# Patient Record
Sex: FEMALE | Race: WHITE
[De-identification: names, ages, dates, MRNs, and addresses within clinical notes are randomized per-mention and may not be internally consistent; named-entity substitution may affect disease eponyms.]

---

## 2017-11-27 ENCOUNTER — HOSPITAL ENCOUNTER (OUTPATIENT)
Dept: HOSPITAL 47 - RADMAMWWP | Age: 57
Discharge: HOME | End: 2017-11-27
Payer: COMMERCIAL

## 2017-11-27 DIAGNOSIS — Z12.31: Primary | ICD-10-CM

## 2017-11-27 PROCEDURE — 77063 BREAST TOMOSYNTHESIS BI: CPT

## 2017-11-27 NOTE — MM
Reason for exam: screening  (asymptomatic).

Last mammogram was performed 3 years and 6 months ago.



History:

Patient had first child at age 36.

Family history of breast cancer in mother at age 75.

Cyst aspiration of the left breast, 2013.



Physical Findings:

A clinical breast exam by your physician is recommended on an annual basis and 

results should be correlated with mammographic findings.



MG 3D Screening Mammo W/Cad

Bilateral CC and MLO view(s) were taken.

Prior study comparison: June 11, 2014, bilateral MG diagnostic mammo w CAD [facility]AMILETH.  

May 8, 2013, CAD bilateral diagnostic mammogram.

The breast tissue is heterogeneously dense. This may lower the sensitivity of 

mammography.

Finding: There are faint, fine grouped calcifications in the upper quadrant, 

anterior position of the left breast in the MLO position 2 cm from nipple.

New finding since June 11, 2014 and May 8, 2013.





ASSESSMENT: Incomplete: need additional imaging evaluation, BI-RAD 0



RECOMMENDATION:

Special view mammogram of the left breast.



If lesion persists on supplemental views, image directed ultrasound is 

recommended.



[facility] will attempt to contact patient to return for supplemental 

views and ultrasound if indicated.

## 2017-11-30 ENCOUNTER — HOSPITAL ENCOUNTER (OUTPATIENT)
Dept: HOSPITAL 47 - RADMAMWWP | Age: 57
Discharge: HOME | End: 2017-11-30
Payer: COMMERCIAL

## 2017-11-30 DIAGNOSIS — R92.8: Primary | ICD-10-CM

## 2017-11-30 NOTE — MM
Reason for exam: additional evaluation requested from abnormal screening.

Last mammogram was performed less than 1 month ago.



History:

Patient is postmenopausal and had first child at age 36.

Family history of breast cancer in mother at age 75.

Cyst aspiration of the left breast, 2013.



Physical Findings:

Nurse did not find any significant physical abnormalities on exam.



MG Work Up Mamm w CAD BILAT

Bilateral LM, CC with magnification, and LM with magnification view(s) were taken.

Prior study comparison: November 27, 2017, bilateral MG 3d screening mammo w/cad. 

June 11, 2014, bilateral MG diagnostic mammo w CAD YAMILETH.

The breast tissue is heterogeneously dense. This may lower the sensitivity of 

mammography.  Left small heterogeneous group of calcifications far laterally at 

3-4 o'clock anteriorly, not clearly seen previously for which a biopsy 

recommended.

Right 9 o'clock small group of increasing calcifications at 9 o'clock middle depth

appear punctate, round and number approximately 4 calcifications for which a 6 

month follow up is recommended.



These results were verbally communicated with the patient and result sheet given 

to the patient on 11/30/17.





ASSESSMENT: Suspicious, BI-RAD 4

Suspicious, BI-RAD 4 abnormality in the left breast.

Probably benign, BI-RAD 3 finding in the right breast.



RECOMMENDATION:

Surgical consultation and stereotactic core biopsy of the left breast.

Follow-up diagnostic mammogram of the right breast in 6 months.



Called Dr. Lopez with mammographic findings and has scheduled an appointment for 

the patient with Dr. Langford.





PRELIMINARY REPORT CALLED AND FAXED TO DR. LANGFORD ON 11/30/17.

## 2018-06-14 ENCOUNTER — HOSPITAL ENCOUNTER (OUTPATIENT)
Dept: HOSPITAL 47 - RADMAMWWP | Age: 58
Discharge: HOME | End: 2018-06-14
Payer: COMMERCIAL

## 2018-06-14 DIAGNOSIS — R92.8: Primary | ICD-10-CM

## 2018-06-14 PROCEDURE — 77062 BREAST TOMOSYNTHESIS BI: CPT

## 2018-06-14 PROCEDURE — 77066 DX MAMMO INCL CAD BI: CPT

## 2018-06-14 NOTE — MM
Reason for exam: follow-up at short interval from prior study.

Last mammogram was performed 6 months ago.



History:

Patient is postmenopausal and had first child at age 36.

Family history of breast cancer in mother at age 75.

MG discontinued stereo core LT of the left breast, December 8, 2017.  Cyst 

aspiration of the left breast, 2013.



Physical Findings:

Nurse did not find any significant physical abnormalities on exam.



MG 3D Diag Mammo W/Cad YAMILETH

Bilateral CC, MLO, CC with magnification, and LM view(s) were taken.

Prior study comparison: November 30, 2017, bilateral MG work up mamm w CAD BILAT. 

November 27, 2017, bilateral MG 3d screening mammo w/cad.

The breast tissue is heterogeneously dense. This may lower the sensitivity of 

mammography.

Finding: There are few typically benign round, grouped, regional calcifications in

both breasts. There is no discrete abnormality.



These results were verbally communicated with the patient and result sheet given 

to the patient on 6/14/18.





ASSESSMENT: Benign, BI-RAD 2



RECOMMENDATION:

Routine screening mammogram of both breasts in 1 year.

## 2019-06-17 ENCOUNTER — HOSPITAL ENCOUNTER (OUTPATIENT)
Dept: HOSPITAL 47 - RADMAMWWP | Age: 59
Discharge: HOME | End: 2019-06-17
Payer: COMMERCIAL

## 2019-06-17 DIAGNOSIS — Z12.31: Primary | ICD-10-CM

## 2019-06-17 PROCEDURE — 77063 BREAST TOMOSYNTHESIS BI: CPT

## 2019-06-17 PROCEDURE — 77067 SCR MAMMO BI INCL CAD: CPT

## 2019-06-18 NOTE — MM
Reason for exam: screening  (asymptomatic).

Last mammogram was performed 1 year ago.



History:

Patient is postmenopausal and had first child at age 36.

Family history of breast cancer in mother at age 75.

MG discontinued stereo core LT of the left breast, December 8, 2017.  Cyst 

aspiration of the left breast, 2013.



Physical Findings:

A clinical breast exam by your physician is recommended on an annual basis and 

results should be correlated with mammographic findings.



MG 3D Screening Mammo W/Cad

Bilateral CC and MLO view(s) were taken.

Prior study comparison: June 14, 2018, bilateral MG 3d diag mammo w/cad YAMILETH.  

November 30, 2017, bilateral MG work up mamm w CAD BILAT.

The breast tissue is heterogeneously dense. This may lower the sensitivity of 

mammography.  No significant changes when compared with prior studies.





ASSESSMENT: Benign, BI-RAD 2



RECOMMENDATION:

Routine screening mammogram of both breasts in 1 year.

## 2020-09-09 ENCOUNTER — HOSPITAL ENCOUNTER (OUTPATIENT)
Dept: HOSPITAL 47 - RADMAMWWP | Age: 60
Discharge: HOME | End: 2020-09-09
Attending: FAMILY MEDICINE
Payer: COMMERCIAL

## 2020-09-09 DIAGNOSIS — Z13.820: ICD-10-CM

## 2020-09-09 DIAGNOSIS — M81.0: ICD-10-CM

## 2020-09-09 DIAGNOSIS — Z12.31: Primary | ICD-10-CM

## 2020-09-09 PROCEDURE — 77067 SCR MAMMO BI INCL CAD: CPT

## 2020-09-09 PROCEDURE — 77063 BREAST TOMOSYNTHESIS BI: CPT

## 2020-09-09 PROCEDURE — 77080 DXA BONE DENSITY AXIAL: CPT

## 2020-09-09 NOTE — BD
EXAMINATION TYPE: Axial Bone Density

 

DATE OF EXAM: 9/9/2020

 

COMPARISON: NONE

 

CLINICAL HISTORY: Postmenopausal screening

 

Height:  5 FT 6 1/2 IN

Weight:  165

 

FRAX RISK QUESTIONS:

Alcohol (3 or more units per day):  NO

Family History (Parent hip fracture):  YES

Glucocorticoids (More than 3mos):  NO

           (Ex: prednisone, prednisolone, methylprednisolone, dexamethasone, and hydrocortisone).    
     

History of Fracture in Adulthood: YES

Secondary Osteoporosis:

  1.  Type 1 Diabetes: NO

  2.  Hyperthyroidism: NO

  3.  Menopause before 45: NO

  4.  Malnutrition: NO

  5.  Chronic liver disease: NO

Rheumatoid Arthritis: NO

Current Tobacco Use: NO

 

RISK FACTORS 

HISTORY OF: 

Family History of Osteoporosis: YES

Active: YES

Postmenopausal woman: AGE 54

 

MEDICATIONS: 

Additional Medications: ZYRTEC,ADDORAX PRN

 

 

Additional History: CHRONIC HIVES

 

 

EXAM MEASUREMENTS: 

Bone mineral densitometry was performed using the Pin or Peg System.

Bone mineral density as measured about the Lumbar spine is:  

----- L1-L4(G/cm2): 1.073

T Score Values are as follows:

----- L2: -1.0

----- L3: -1.1

----- L4: -0.2

----- L1-L4: -0.9

Bone mineral density has: DECREASED  -2.2 % since study of: 2017

 

Bone mineral density about the R hip (g/cm2): 0.717

Bone mineral density about the L hip (g/cm2): 0.774

T Score values are as follows:

-----R Neck: -2.3

-----L Neck: -1.9

-----R Total: -2.9

-----L Total: -2.6

Bone mineral density has: DECREASED  -4.6 % since study of: 2017

 

 

IMPRESSION:

Osteoporosis (T Score less than -2.5).

 

There is increased fracture risk and therapy is usually indicated based on age.

 

Re-Screen 1-2 years.

 

 

 

 

 

NOTE:  T-SCORE=SD OF THE YOUNG ADULT MEAN.

## 2020-09-10 NOTE — MM
Reason for exam: screening  (asymptomatic).

Last mammogram was performed 1 year and 3 months ago.



History:

Patient is postmenopausal and had first child at age 36.

Family history of breast cancer in mother at age 75.

MG discontinued stereo core LT of the left breast, December 8, 2017.  Cyst 

aspiration of the left breast, 2013.



Physical Findings:

A clinical breast exam by your physician is recommended on an annual basis and 

results should be correlated with mammographic findings.



MG 3D Screening Mammo W/Cad

Bilateral CC and MLO view(s) were taken.

Prior study comparison: June 17, 2019, bilateral MG 3d screening mammo w/cad.  

June 14, 2018, bilateral MG 3d diag mammo w/cad [facility]AMILETH.

The breast tissue is heterogeneously dense. This may lower the sensitivity of 

mammography.  There are benign appearing round calcifications bilaterally. There 

is no discrete abnormality.





ASSESSMENT: Benign, BI-RAD 2



RECOMMENDATION:

Routine screening mammogram of both breasts in 1 year.

## 2021-09-13 ENCOUNTER — HOSPITAL ENCOUNTER (OUTPATIENT)
Dept: HOSPITAL 47 - RADMAMWWP | Age: 61
Discharge: HOME | End: 2021-09-13
Attending: FAMILY MEDICINE
Payer: COMMERCIAL

## 2021-09-13 DIAGNOSIS — Z12.31: Primary | ICD-10-CM

## 2021-09-13 DIAGNOSIS — Z78.0: ICD-10-CM

## 2021-09-13 DIAGNOSIS — Z80.3: ICD-10-CM

## 2021-09-13 PROCEDURE — 77063 BREAST TOMOSYNTHESIS BI: CPT

## 2021-09-13 PROCEDURE — 77067 SCR MAMMO BI INCL CAD: CPT

## 2021-09-15 NOTE — MM
Reason for exam: screening  (asymptomatic).

Last mammogram was performed 1 year ago.



History:

Patient is postmenopausal and had first child at age 36.

Family history of breast cancer in mother at age 75.

MG discontinued stereo core LT of the left breast, December 8, 2017.  Benign cyst 

aspiration of the left breast, 2013.



Physical Findings:

A clinical breast exam by your physician is recommended on an annual basis and 

results should be correlated with mammographic findings.



MG 3D Screening Mammo W/Cad

Bilateral CC and MLO view(s) were taken.

Prior study comparison: September 9, 2020, bilateral MG 3d screening mammo w/cad. 

June 17, 2019, bilateral MG 3d screening mammo w/cad.  June 14, 2018, bilateral 

MG 3d diag mammo w/cad YAMILETH.  November 27, 2017, bilateral MG 3d screening mammo 

w/cad.

There are scattered fibroglandular densities.

Finding: There are indeterminate calcifications in the lower outer quadrant of the

left breast 3-4cm from the nipple.

New finding since September 9, 2020, June 17, 2019, June 14, 2018, and November 27, 2017.





ASSESSMENT: Incomplete: need additional imaging evaluation, BI-RAD 0



RECOMMENDATION:

Special view mammogram of the left breast.



[facility] will attempt to contact patient to return for supplemental 

views.

## 2021-09-17 ENCOUNTER — HOSPITAL ENCOUNTER (OUTPATIENT)
Dept: HOSPITAL 47 - RADMAMWWP | Age: 61
Discharge: HOME | End: 2021-09-17
Attending: FAMILY MEDICINE
Payer: COMMERCIAL

## 2021-09-17 DIAGNOSIS — R92.1: Primary | ICD-10-CM

## 2021-09-17 DIAGNOSIS — Z80.3: ICD-10-CM

## 2021-09-17 DIAGNOSIS — Z78.0: ICD-10-CM

## 2021-09-17 PROCEDURE — 77061 BREAST TOMOSYNTHESIS UNI: CPT

## 2021-09-17 PROCEDURE — 77065 DX MAMMO INCL CAD UNI: CPT

## 2021-09-17 NOTE — MM
Reason for exam: additional evaluation requested from abnormal screening.

Last mammogram was performed less than 1 month ago.



History:

Patient is postmenopausal and had first child at age 36.

Family history of breast cancer in mother at age 75.

MG discontinued stereo core LT of the left breast, December 8, 2017.  Benign cyst 

aspiration of the left breast, 2013.



Physical Findings:

Nurse did not find any significant physical abnormalities on exam.



MG 3D Work Up W/Cad LT

CC, LM with magnification, and LM view(s) were taken of the left breast.

Prior study comparison: September 13, 2021, bilateral MG 3d screening mammo w/cad.

September 9, 2020, bilateral MG 3d screening mammo w/cad.

The breast tissue is heterogeneously dense. This may lower the sensitivity of 

mammography.

Finding: There are indeterminate grouped/clustered calcifications in the outer 

quadrant, central position of the left breast.



These results were verbally communicated with the patient and result sheet given 

to the patient on 9/17/21.





ASSESSMENT: Suspicious, BI-RAD 4



RECOMMENDATION:

Surgical consultation and stereotactic core biopsy of the left breast.



Called office with mammographic findings and has scheduled an appointment for the 

patient for 10/14/21 at 2:00 with Dr. Langford.

Biopsy scheduled for 10/15/21 at 8:00.



PRELIMINARY REPORT CALLED AND FAXED TO DR. LANGFORD ON 9/17/21.

## 2021-10-14 ENCOUNTER — HOSPITAL ENCOUNTER (OUTPATIENT)
Dept: HOSPITAL 47 - WWCWWP | Age: 61
End: 2021-10-14
Attending: SURGERY
Payer: COMMERCIAL

## 2021-10-14 VITALS
DIASTOLIC BLOOD PRESSURE: 79 MMHG | SYSTOLIC BLOOD PRESSURE: 119 MMHG | TEMPERATURE: 98.1 F | RESPIRATION RATE: 12 BRPM | HEART RATE: 55 BPM

## 2021-10-14 DIAGNOSIS — Z80.1: ICD-10-CM

## 2021-10-14 DIAGNOSIS — N60.11: Primary | ICD-10-CM

## 2021-10-14 DIAGNOSIS — R92.8: ICD-10-CM

## 2021-10-14 DIAGNOSIS — J45.909: ICD-10-CM

## 2021-10-14 DIAGNOSIS — Z88.0: ICD-10-CM

## 2021-10-14 DIAGNOSIS — Z87.891: ICD-10-CM

## 2021-10-14 DIAGNOSIS — N60.12: ICD-10-CM

## 2021-10-14 NOTE — P.GSHP
History of Present Illness


H&P Date: 10/14/21


Chief Complaint: abnormal left breast mammogram





     Stephanie is a 61 year old white female seen in consultation for Dr. Lopez 

with a bilateral mammogram on 21 which showed an area of calcification in 

the left breast.  This was a routine mammogram.  She does not feel anything of 

concern in either breast.  She is not complaining of any nipple discharge or 

skin changes.  She was scheduled for left breast stereotactic core biopsy 

approximately 4 years ago however at the attempt to do the biopsy the procedure 

was canceled as the lesion could not be identified.  This is the first mammogram

since that time that she was again recommended to undergo core biopsy.


     She has not had any surgery on her breast. Patient had the COVID vaccine. 





Caffeine: 2 cups/day


nicotine: none


chocolate: occasional


BCP: used for about 10  years, stopped 20 years ago





Family history:


father: melanoma


mother: breast cancer  ? thyroid cancer


maternal aunt: ovarian cancer


maternal aunt: lung cancer


paternal aunt: colon cancer








Hormonal History:


menarche: 16


[id number], breast fed: yes, age at first live birth: 36


menopause: 54


hormones: none





Surgical history:


d&C after miscarriage


finger 








Medical History:


asthma








Social History:


nicotine: none


alcohol: 3-5 times/week wine


drugs: none 

















- Constitutional


Constitutional: Reports sweats, Denies chills, Denies fever





- EENT


Eyes: denies blurred vision, denies pain


Ears: deny: decreased hearing, tinnitus


Ears, nose, mouth and throat: Denies headache, Denies sore throat





- Breasts


Breasts: bilateral: as per HPI





- Cardiovascular


Cardiovascular: Denies chest pain, Denies shortness of breath





- Respiratory


Respiratory: Denies cough, Denies 7





- Gastrointestinal


Gastrointestinal: Denies abdominal pain, Denies diarrhea, Denies nausea, Denies 

vomiting





- Genitourinary (Female)


Genitourinary: Denies dysuria, Denies hematuria





- Menstruation


Menstruation: Reports postmenopausal





- Musculoskeletal


Musculoskeletal: Denies myalgias





- Integumentary


Integumentary: Denies pruritus, Denies rash





- Neurological


Neurological: Denies numbness, Denies weakness





- Psychiatric


Psychiatric: Denies anxiety, Denies depression





- Endocrine


Endocrine: Reports weight change, Denies fatigue





- Hematologic/Lymphatic


Comment: 





none





- Allergic/Immunologic


Allergic/Immunologic: Reports as per HPI





Past Medical History


Additional Past Medical History / Comment(s): Patient states she broke distal 

fibula on 10-01-17


History of Any Multi-Drug Resistant Organisms: None Reported


Additional Past Surgical History / Comment(s): D & C procedure following 

miscarriage, surgery on hand to remove wood splinter, wisdom teeth extracted at 

age 20


Past Anesthesia/Blood Transfusion Reactions: Postoperative Nausea & Vomiting 

(PONV)


Additional Past Anesthesia/Blood Transfusion Reaction / Comment(s): PONV at age 

20 with wisdom teeth extraction, none experienced with D&C or Hand surgery


Past Psychological History: No Psychological Hx Reported


Smoking Status: Former smoker


Past Alcohol Use History: Occasional


Additional Past Alcohol Use History / Comment(s): Patient states she smoked 

about 1/2 pack/day during ages [age] years old.


Past Drug Use History: None Reported





Medications and Allergies


                                    Allergies











Allergy/AdvReac Type Severity Reaction Status Date / Time


 


Penicillins Allergy  Rash/Hives Verified 10/14/21 14:09














Surgical - Exam


                                   Vital Signs











Temp Pulse Resp BP Pulse Ox


 


 98.1 F   55 L  12   119/79   98 


 


 10/14/21 14:10  10/14/21 14:10  10/14/21 14:10  10/14/21 14:10  10/14/21 14:10














BMI 21.1





- General


moderate distress





- Eyes


normal ocular movement





- ENT


no hearing loss





- Neck


trachea midline





- Respiratory


normal respiratory effort, clear to auscultation





- Cardiovascular


Rhythm: regular


Heart Sounds: normal: S1, S2





- Abdomen


Abdomen: soft





- Integumentary





normal turgor





- Musculoskeletal


normal gait





- Psychiatric


oriented to time, oriented to person, oriented to place, speech is normal, 

memory intact





Breast Exam:


BRA: 36C


inspection: bilateral grade 3 ptosis


Operation:


Right breast: Multi-positional exam fibrocystic changes no dominant masses or 

nodules of concern


Right axilla: No adenopathy of concern


Left breast: Multi-positional exam fibrocystic changes no dominant masses or 

nodules of concern


Left axilla: No adenopathy of concern





Results





bilateral mammograms reviewed with DR. Mckeon





Assessment and Plan


Assessment: 





Impression:


1.  Radiographic abnormality left breast


2.  Bilateral fibrocystic breast changes


3.  Family history of cancer


4.  Stripping of asthma





Plan:


1.  Stereotactic core biopsy left breast





Risk and benefits of the procedure discussed with the patient and she 

understands and wishes to proceed.  Risks include but are not limited to 

bleeding, infection, reaction to the anesthetic.  The area cannot be well 

visualized the procedure may be canceled.  If biopsy reveals atypia or 

malignancy then additional therapy may be needed.  Also if the lesion is 

discordant then biopsy the operating room may be recommended.  Alternatives such

as watchful waiting or removal in the operating room discussed but not 

recommended.  The patient understands and wishes to proceed.





Cc: Dr. Lopez

## 2021-10-15 ENCOUNTER — HOSPITAL ENCOUNTER (OUTPATIENT)
Dept: HOSPITAL 47 - RADMAMWWP | Age: 61
End: 2021-10-15
Attending: SURGERY
Payer: COMMERCIAL

## 2021-10-15 VITALS — RESPIRATION RATE: 16 BRPM | TEMPERATURE: 97.9 F

## 2021-10-15 VITALS — DIASTOLIC BLOOD PRESSURE: 79 MMHG | HEART RATE: 53 BPM | SYSTOLIC BLOOD PRESSURE: 129 MMHG

## 2021-10-15 DIAGNOSIS — N60.12: Primary | ICD-10-CM

## 2021-10-15 PROCEDURE — 88305 TISSUE EXAM BY PATHOLOGIST: CPT

## 2021-10-15 PROCEDURE — 19081 BX BREAST 1ST LESION STRTCTC: CPT

## 2021-10-15 NOTE — MM
Stephanie is a 61 year old female who was noted to have some 
microcalcifications of concern in the outer quadrant central position of the 
left breast.  Was recommended she undergo stereotactic core biopsy.  Risks and 
benefits of the procedure were discussed with the patient.  Additionally we 
discussed the peripheral location of the lesion as to whether this would be 
accessible by stereotactic core biopsy.  She understood and wished to proceed.  
Alternatives such as watchful waiting for resection in the operating room were 
discussed but not recommended.



The patient was taken to the stereotactic core biopsy room.  She was positioned 
prone on the lower lid table.  A  film was obtained.  The area of concern 
was identified.  A lateral to medial approach was utilized.  The lesion was 
targeted.  The breast was prepped using Betadine.  20 mL of 1% lidocaine were 
used to anesthetize the area of concern.  A petite 9-gauge needle was driven to 
the correct coordinates.  A prefire film was obtained.  The needle was noted to 
be in the correct location.  The needle was fired.  Post fire film was obtained.
 The needle was again noted to be in the correct location.  18 core specimens 
were obtained.  Radiograph of the specimen revealed the microcalcifications of 
concern had been removed.  A try Claudio bowtie clip was placed.  The clip appeared
to be in the correct location.  The specimen was sent for pathology.  The 
patient will follow-up with Dr. Hector next week.  A mammogram will be performed 
following this procedure to assure that the clip was in the correct location.


SINGH

## 2021-10-15 NOTE — P.PCN
Date of Procedure: 10/15/21


Preoperative Diagnosis: 


Microcalcifications of concern left breast, outer quadrant central position of 

the breast


Postoperative Diagnosis: 


Same


Procedure(s) Performed: 


Stereotactic core biopsy left breast


Anesthesia: local


Surgeon: Mayelin Langford


Pathology: other (Breast tissue)


Condition: stable


Disposition: same day


Indications for Procedure: 


Microcalcifications of concern in left breast upper outer central breast


Operative Findings: 


Radiographic specimen reveals microcalcifications of concern


Description of Procedure: 


     Stephanie is a 61 year old female who was noted to have some 

microcalcifications of concern in the outer quadrant central position of the 

left breast.  Was recommended she undergo stereotactic core biopsy.  Risks and 

benefits of the procedure were discussed with the patient.  Additionally we 

discussed the peripheral location of the lesion as to whether this would be 

accessible by stereotactic core biopsy.  She understood and wished to proceed.  

Alternatives such as watchful waiting for resection in the operating room were 

discussed but not recommended.





The patient was taken to the stereotactic core biopsy room.  She was positioned 

prone on the lower lid table.  A  film was obtained.  The area of concern 

was identified.  A lateral to medial approach was utilized.  The lesion was 

targeted.  The breast was prepped using Betadine.  20 mL of 1% lidocaine were 

used to anesthetize the area of concern.  A petite 9-gauge needle was driven to 

the correct coordinates.  A prefire film was obtained.  The needle was noted to 

be in the correct location.  The needle was fired.  Post fire film was obtained.

 The needle was again noted to be in the correct location.  18 core specimens 

were obtained.  Radiograph of the specimen revealed the microcalcifications of 

concern had been removed.  A try Claudio bowtie clip was placed.  The clip appeared

to be in the correct location.  The specimen was sent for pathology.  The 

patient will follow-up with Dr. Hector next week.  A mammogram will be performed 

following this procedure to assure that the clip was in the correct location.

## 2021-10-21 ENCOUNTER — HOSPITAL ENCOUNTER (OUTPATIENT)
Dept: HOSPITAL 47 - WWCWWP | Age: 61
End: 2021-10-21
Attending: SURGERY
Payer: COMMERCIAL

## 2021-10-21 VITALS
SYSTOLIC BLOOD PRESSURE: 101 MMHG | RESPIRATION RATE: 16 BRPM | DIASTOLIC BLOOD PRESSURE: 68 MMHG | TEMPERATURE: 98.5 F | HEART RATE: 55 BPM

## 2021-10-21 DIAGNOSIS — R92.1: ICD-10-CM

## 2021-10-21 DIAGNOSIS — N60.12: Primary | ICD-10-CM

## 2021-10-21 DIAGNOSIS — Z87.891: ICD-10-CM

## 2021-10-21 DIAGNOSIS — N60.82: ICD-10-CM

## 2021-10-21 DIAGNOSIS — N60.22: ICD-10-CM

## 2021-10-21 DIAGNOSIS — Z88.0: ICD-10-CM

## 2021-10-21 NOTE — P.PN
Progress Note - Text


Progress Note Date: 10/21/21





     Stephanie is a 61-year-old white female status post her tactic core biopsy of 

the left breast and [id number].  Pathology revealed fibrocystic changes including 

sclerosing adenosis with calcifications, cyst, fibrosis and apocrine metaplasia.

 The patient tolerated the procedure without difficulty.








Physical examination:


Lungs: Clear


Heart: Regular rate and rhythm


Biopsy site clean and dry no evidence of infection





Impression:


Patient status post her tactic core biopsy pathology benign





This is felt to be benign and concordant





Plan:


Left breast mammogram and physician exam in 6 months





CC: Dr. Lopez

## 2022-07-27 ENCOUNTER — HOSPITAL ENCOUNTER (OUTPATIENT)
Dept: HOSPITAL 47 - RADMAMWWP | Age: 62
Discharge: HOME | End: 2022-07-27
Attending: SURGERY
Payer: COMMERCIAL

## 2022-07-27 DIAGNOSIS — R92.8: Primary | ICD-10-CM

## 2022-07-27 DIAGNOSIS — Z80.3: ICD-10-CM

## 2022-07-27 DIAGNOSIS — Z78.0: ICD-10-CM

## 2022-07-27 PROCEDURE — 77062 BREAST TOMOSYNTHESIS BI: CPT

## 2022-07-27 PROCEDURE — 77066 DX MAMMO INCL CAD BI: CPT

## 2022-07-27 NOTE — MM
Reason for Exam: Additional evaluation requested from abnormal screening. 

Last screening mammogram was performed 10 month(s) ago.





Patient History: 

Menarche at age 16. First Full-Term Pregnancy at age 36. Late child-bearing (after 30).

Postmenopausal. 2013, Benign Cyst Aspiration on the left side. 10/15/2021, Benign Core Biopsy on the

left side. 12/8/2017, MG discontinued stereo core LT on the left side.

Mother had breast cancer, age 75. 





Risk Values: 

Ashleigh 5 year model risk: 3.1%.

NCI Lifetime model risk: 15.3%.





Tissue Density: 

The breast tissue is heterogeneously dense. This may lower the sensitivity of mammography.





Findings: 

Analyzed By CAD. 

Benign calcifications within the right and left breast. A core markers within the anterior left

breast. 





Overall Assessment: Benign, BI-RAD 2





Management: 

Screening Mammogram of both breasts in 1 year.

A clinical breast exam by your physician is recommended on an annual basis and results should be

correlated with mammographic findings.  This exam should not preclude additional follow-up of

suspicious palpable abnormalities.  Results were given to the patient verbally at the time of exam.

Patient should continue monthly self breast exam.



Electronically signed and approved by: Cas Barragan D.O. Radiologis

## 2022-08-18 ENCOUNTER — HOSPITAL ENCOUNTER (OUTPATIENT)
Dept: HOSPITAL 47 - RADBDWWP | Age: 62
Discharge: HOME | End: 2022-08-18
Attending: FAMILY MEDICINE
Payer: COMMERCIAL

## 2022-08-18 DIAGNOSIS — M81.0: Primary | ICD-10-CM

## 2022-08-18 PROCEDURE — 77080 DXA BONE DENSITY AXIAL: CPT

## 2022-08-18 NOTE — BD
EXAMINATION TYPE: Axial Bone Density

 

DATE OF EXAM: 8/18/2022

 

COMPARISON: NONE

 

CLINICAL HISTORY: 62 years year old Female.  ICD-10 CODE: M81.0 OSTEOPOROSIS

 

Height:  67

Weight:  154.3

 

FRAX RISK QUESTIONS:

Alcohol (3 or more units per day):  NO

Family History (Parent hip fracture):  MOTHER (BILATERAL)

Glucocorticoids (More than 3mos):  NO     

History of Fracture in Adulthood: ANKLE

 

Secondary Osteoporosis:

  1.  Type 1 Diabetes: NO

  2.  Hyperthyroidism: NO

  3.  Menopause before 45: NO

  4.  Malnutrition: NO

  5.  Chronic liver disease: NO

Rheumatoid Arthritis: NO

Current Tobacco Use: NO

 

RISK FACTORS 

HISTORY OF: 

Hip Fracture (Right/Left): NO

Spine Fracture: NO

History of Wrist Fracture: NO

Surgery to Spine/Hip(right/left)/Wrist (right/left): NO

Family History of Osteoporosis: MOTHER, MATERNAL AUNT 

Active: YES

Diet low in dairy products/other sources of calcium:  YES

Postmenopausal woman: YES

Take estrogen and/or progesterone medications: NO

Lost more than 2 inches in height since high school: NO

Frequent falls: NO

Poor Health: NO

Hyperparathyroidism: NO

Adrenal Insufficiency: NO

 

MEDICATIONS: 

Prednisone or other steroids: NO

Thyroid Medications: NO 

Osteoporosis Medications: NO

Which medication:

How Long:

Additional Medications: CALCIUM, VIT D, ZINC, QUERCETIN, 

 

 

 

EXAM MEASUREMENTS: 

Bone mineral densitometry was performed using the [employer] System.

Bone mineral density as measured about the Lumbar spine is:  

----- L1-L4(G/cm2): 1.050

T Score Values are as follows:

----- L1: -1.4

----- L2: -1.3

----- L3: -1.2

----- L4: -0.7

----- L1-L4: -1.1

Bone mineral density has: DECREASED 4.9 % since study of: 11/03/2017

 

Bone mineral density about the R hip (g/cm2): 0.716

Bone mineral density about the L hip (g/cm2): 0.767

T Score values are as follows:

-----R Neck: -2.3

-----L Neck: -2.0

-----R Total: -2.9

-----L Total: -2.7

 

Bone mineral density has: DECREASED 5.2 % since study of: 11/03/2017

 

FRAX%s: The graph provided illustrates a 32.6% chance for a major osteoporotic fx and a 3.4% chance f
or the hips probability for fx in 10 years time.

 

IMPRESSION:

Osteoporosis (T Score less than -2.5).

 

There is increased fracture risk and therapy is usually indicated based on age.

 

Re-Screen 1-2 years.

 

NOTE:  T-SCORE=SD OF THE YOUNG ADULT MEAN.

## 2023-07-28 ENCOUNTER — HOSPITAL ENCOUNTER (OUTPATIENT)
Dept: HOSPITAL 47 - RADMAMWWP | Age: 63
Discharge: HOME | End: 2023-07-28
Attending: SURGERY
Payer: COMMERCIAL

## 2023-07-28 DIAGNOSIS — Z78.0: ICD-10-CM

## 2023-07-28 DIAGNOSIS — Z12.31: Primary | ICD-10-CM

## 2023-07-28 DIAGNOSIS — Z80.3: ICD-10-CM

## 2023-07-28 PROCEDURE — 77063 BREAST TOMOSYNTHESIS BI: CPT

## 2023-07-28 PROCEDURE — 77067 SCR MAMMO BI INCL CAD: CPT

## 2023-07-31 NOTE — MM
Reason for Exam: Screening  (asymptomatic). 

Last screening mammogram was performed 12 month(s) ago.





Patient History: 

Menarche at age 16. First Full-Term Pregnancy at age 36. Late child-bearing (after 30).

Postmenopausal. Patient has history of breast feeding. 2013, Benign Cyst Aspiration on the left

side. 10/15/2021, Benign Core Biopsy on the left side. 12/8/2017, MG discontinued stereo core LT on

the left side.

Mother had breast cancer, age 75. 





Risk Values: 

Ashleigh 5 year model risk: 3.2%.

NCI Lifetime model risk: 14.9%.





Prior Study Comparison: 

9/13/2021 Bilateral Screening Mammogram, [facility]. 9/17/2021 Left Diagnostic Mammogram, [facility]. 7/27/2022

Bilateral MG 3D diag mammo w/cad YAMILETH, [facility]. 





Tissue Density: 

The breast tissue is heterogeneously dense. This may lower the sensitivity of mammography.





Findings: 

Analyzed By CAD. 

There is no suspicious group of microcalcifications or new suspicious mass in either breast. 





Overall Assessment: Negative, BI-RAD 1





Management: 

Screening Mammogram of both breasts in 1 year.

[facility] will attempt to contact patient to return for supplemental views and

ultrasound if indicated.



Patient should continue monthly self-breast exams.  A clinical breast exam by your physician is

recommended on an annual basis.

This exam should not preclude additional follow-up of suspicious palpable abnormalities.



Note on Ashleigh scores and lifetime risk:

1. A Ashleigh score greater than 3% is considered moderate risk. If this is the case, consider

specialist referral to assess eligibility for a risk reducing agent.

2. If overall lifetime risk for the development of breast cancer is 20% or higher, the patient may

qualify for future screening with alternating mammogram and breast MRI.



Electronically signed and approved by: Ruel Kelsey DO

## 2023-08-03 ENCOUNTER — HOSPITAL ENCOUNTER (OUTPATIENT)
Dept: HOSPITAL 47 - WWCWWP | Age: 63
End: 2023-08-03
Attending: SURGERY
Payer: COMMERCIAL

## 2023-08-03 VITALS
TEMPERATURE: 98 F | DIASTOLIC BLOOD PRESSURE: 79 MMHG | SYSTOLIC BLOOD PRESSURE: 116 MMHG | RESPIRATION RATE: 18 BRPM | HEART RATE: 59 BPM

## 2023-08-03 DIAGNOSIS — Z71.2: Primary | ICD-10-CM

## 2023-08-03 DIAGNOSIS — Z80.3: ICD-10-CM

## 2023-08-03 DIAGNOSIS — J45.909: ICD-10-CM

## 2023-08-03 DIAGNOSIS — Z88.0: ICD-10-CM

## 2023-08-03 DIAGNOSIS — Z87.891: ICD-10-CM

## 2023-08-03 DIAGNOSIS — N60.19: ICD-10-CM

## 2023-08-03 NOTE — P.PN
Subjective


Progress Note Date: 23


Principal diagnosis: 





fibrocystic breast changes


Fibrocystic breast changes


   Stephanie is a 63 year old white female seen initially in consultation for Dr. Lopez with a bilateral mammogram on 21 which showed an area of 

calcification in the left breast.  This was a routine mammogram.  She did not 

feel anything of concern in either breast.  She was not complaining of any 

nipple discharge or skin changes.  She was scheduled for a left breast 

stereotactic core biopsy approximately 4 years ago however at the attempt to do 

the biopsy the procedure was canceled as the lesion could not be identified.  

The first mammogram since that time that she was again recommended to undergo 

core biopsy.  Underwent a left  stero-tactic core biopsy on 1015.  This was 

benign.








She had a bilateral mammogram performed on  which was benign BIRADS 2.  

This is been personally reviewed.  Does not complain of any new lumps masses or 

nodules of concern in either breast.





5 year debbie risk was 3.2%


NCI lifetime risk 14.9%





We've discussed chemoprophylaxis and she has declined at this time.


     





Caffeine: 2 cups/day


nicotine: none


chocolate: occasional


BCP: used for about 10  years, stopped 20 years ago





Family history:


father: melanoma


mother: breast cancer  ? thyroid cancer


maternal aunt: ovarian cancer


maternal aunt: lung cancer


paternal aunt: colon cancer








Hormonal History:


menarche: 16


[id number], breast fed: yes, age at first live birth: 36


menopause: 54


hormones: none





Surgical history:


d&C after miscarriage


finger 








Medical History:


asthma








Social History:


nicotine: none


alcohol: 3-5 times/week wine


drugs: none 

















- Constitutional


Constitutional: Reports sweats, Denies chills, Denies fever





- EENT


Eyes: denies blurred vision, denies pain


Ears: deny: decreased hearing, tinnitus


Ears, nose, mouth and throat: Denies headache, Denies sore throat





- Breasts


Breasts: bilateral: as per HPI





- Cardiovascular


Cardiovascular: Denies chest pain, Denies shortness of breath





- Respiratory


Respiratory: Denies cough, Denies 7





- Gastrointestinal


Gastrointestinal: Denies abdominal pain, Denies diarrhea, Denies nausea, Denies 

vomiting





- Genitourinary (Female)


Genitourinary: Denies dysuria, Denies hematuria





- Menstruation


Menstruation: Reports postmenopausal





- Musculoskeletal


Musculoskeletal: Denies myalgias





- Integumentary


Integumentary: Denies pruritus, Denies rash





- Neurological


Neurological: Denies numbness, Denies weakness





- Psychiatric


Psychiatric: Denies anxiety, Denies depression





- Endocrine


Endocrine: Reports weight change, Denies fatigue





- Hematologic/Lymphatic


Comment: 





none





- Allergic/Immunologic


Allergic/Immunologic: Reports as per HPI





Past Medical History


Additional Past Medical History / Comment(s): Patient states she broke distal 

fibula on 10-01-17


History of Any Multi-Drug Resistant Organisms: None Reported


Additional Past Surgical History / Comment(s): D & C procedure following 

miscarriage, surgery on hand to remove wood splinter, wisdom teeth extracted at 

age 20


Past Anesthesia/Blood Transfusion Reactions: Postoperative Nausea & Vomiting 

(PONV)


Additional Past Anesthesia/Blood Transfusion Reaction / Comment(s): PONV at age 

20 with wisdom teeth extraction, none experienced with D&C or Hand surgery


Past Psychological History: No Psychological Hx Reported


Smoking Status: Former smoker


Past Alcohol Use History: Occasional


Additional Past Alcohol Use History / Comment(s): Patient states she smoked 

about 1/2 pack/day during ages [age] years old.


Past Drug Use History: None Reported

















Objective





- Vital Signs


Vital signs: 


                                   Vital Signs











Temp  98.0 F   23 10:49


 


Pulse  59 L  23 10:49


 


Resp  18   23 10:49


 


BP  116/79   23 10:49


 


Pulse Ox  99   23 10:49


 


FiO2      








                                 Intake & Output











 23





 18:59 06:59 18:59


 


Weight   68.039 kg














- Constitutional


General appearance: Present: cooperative





- EENT


Eyes: Present: EOMI


ENT: Present: hearing grossly normal





- Neck


Neck: Present: normal ROM





- Respiratory


Respiratory: bilateral: CTA





- Cardiovascular


Rhythm: regular


Heart sounds: normal: S1, S2





- Gastrointestinal


General gastrointestinal: Present: soft





- Integumentary


Integumentary: Present: normal turgor





- Musculoskeletal


Musculoskeletal: Present: gait normal





- Psychiatric


Psychiatric: Present: A&O x's 3, appropriate affect, intact judgment & insight





- Additional findings


Additional findings: 


Breast Exam:


BRA: 36B


Inspection: Bilateral grade 2 ptosis


Palpation:


Right breast: Multi-positional exam fibrocystic changes no dominant masses or 

nodules of concern


Right axilla: shoddy adenopathy non worrisome


Left breast: Multi-positional exam fibrocystic changes no dominant masses or 

nodules of concern


Left axilla:  shotty adenopathy  non-worrisome











Assessment and Plan


Assessment: 


Impression:


Stable fibrocystic breast changes


Family history breast cancer





Plan:


Bilateral mammogram in 1 year with physician exam at that time


Debbie risk model 3.2%


NCI lifetime model risk 14.9%


Talked about risk reduction, and at this time the patient is not interested.  

She will be followed with close surveillance.





CC: Dr. Lopez

## 2024-08-06 ENCOUNTER — HOSPITAL ENCOUNTER (OUTPATIENT)
Dept: HOSPITAL 47 - RADMAMWWP | Age: 64
Discharge: HOME | End: 2024-08-06
Attending: SURGERY
Payer: COMMERCIAL

## 2024-08-06 DIAGNOSIS — Z78.0: ICD-10-CM

## 2024-08-06 DIAGNOSIS — N60.12: ICD-10-CM

## 2024-08-06 DIAGNOSIS — Z80.3: ICD-10-CM

## 2024-08-06 DIAGNOSIS — Z12.31: Primary | ICD-10-CM

## 2024-08-06 PROCEDURE — 77063 BREAST TOMOSYNTHESIS BI: CPT

## 2024-08-06 PROCEDURE — 77067 SCR MAMMO BI INCL CAD: CPT

## 2024-08-29 NOTE — MM
Reason for Exam: Screening  (asymptomatic). 

Last mammogram was performed 1 year(s) and 1 month(s) ago. 





Patient History: 

Menarche at age 16. First Full-Term Pregnancy at age 36. Late child-bearing (after 30).

Postmenopausal. Patient has history of breast feeding. 2013, Benign Cyst Aspiration on the left

side. 10/15/2021, Benign Core Biopsy on the left side. 12/8/2017, MG discontinued stereo core LT on

the left side.

Mother had breast cancer, age 75. 





Risk Values: 

Ashleigh 5 year model risk: 3.3%.

NCI Lifetime model risk: 14.5%.





Prior Study Comparison: 

9/17/2021 Left Diagnostic Mammogram, [facility]. 7/27/2022 Bilateral MG 3D diag mammo w/cad [facility]TH, [facility].

7/28/2023 Bilateral MG 3D screening mammo w/cad, [facility]. 





Tissue Density: 

There are scattered areas of fibroglandular density.





Findings: 

Analyzed By CAD. 

Left breast biopsy clip.



Right breast: There is no suspicious group of microcalcifications or new suspicious mass.



Left breast: There is no suspicious group of microcalcifications or new suspicious mass. 





Overall Assessment: Negative, BI-RAD 1





Management: 

Screening Mammogram of both breasts in 1 year.

[facility] will attempt to contact patient to return for supplemental views and

ultrasound if indicated.



Patient should continue monthly self-breast exams.  A clinical breast exam by your physician is

recommended on an annual basis.

This exam should not preclude additional follow-up of suspicious palpable abnormalities.



Note on Ashleigh scores and lifetime risk:

1. A Ashleigh score greater than 3% is considered moderate risk. If this is the case, consider

specialist referral to assess eligibility for a risk reducing agent.

2. If overall lifetime risk for the development of breast cancer is 20% or higher, the patient may

qualify for future screening with alternating mammogram and breast MRI.



Electronically signed and approved by: Ruel Kelsey DO

## 2024-09-18 ENCOUNTER — HOSPITAL ENCOUNTER (OUTPATIENT)
Dept: HOSPITAL 47 - WWCWWP | Age: 64
End: 2024-09-18
Attending: SURGERY
Payer: COMMERCIAL

## 2024-09-18 VITALS
TEMPERATURE: 98.2 F | SYSTOLIC BLOOD PRESSURE: 123 MMHG | HEART RATE: 70 BPM | DIASTOLIC BLOOD PRESSURE: 80 MMHG | RESPIRATION RATE: 16 BRPM

## 2024-09-18 DIAGNOSIS — Z80.3: ICD-10-CM

## 2024-09-18 DIAGNOSIS — N60.12: ICD-10-CM

## 2024-09-18 DIAGNOSIS — R92.8: ICD-10-CM

## 2024-09-18 DIAGNOSIS — Z87.891: ICD-10-CM

## 2024-09-18 DIAGNOSIS — N60.11: Primary | ICD-10-CM

## 2024-09-18 DIAGNOSIS — Z88.0: ICD-10-CM

## 2024-09-18 NOTE — P.PN
Subjective


Progress Note Date: 24


Principal diagnosis: 





Fibrocystic breast changes


24


Principal diagnosis: 





fibrocystic breast changes


Fibrocystic breast changes


   Stephanie is a 64 year old white female seen initially in consultation for Dr. Lopez with a bilateral mammogram on 21 which showed an area of 

calcification in the left breast.  This was a routine mammogram.  She did not 

feel anything of concern in either breast.  She was not complaining of any 

nipple discharge or skin changes.  She was scheduled for a left breast 

stereotactic core biopsy approximately 4 years ago however at the attempt to do 

the biopsy the procedure was canceled as the lesion could not be identified.  

The first mammogram since that time that she was again recommended to undergo 

core biopsy.  Underwent a left  stero-tactic core biopsy on 1015.  This was 

benign.








She had a bilateral mammogram performed on 24 which was benign BIRADS 1.  

This is been personally reviewed and interpreted.  Does not complain of any new 

lumps masses or nodules of concern in either breast.





5 year debbie risk was 3.3%


NCI lifetime risk 14.5%





We've discussed chemoprophylaxis and she has declined at this time.


     





Caffeine: 2 cups/day


nicotine: none


chocolate: occasional


BCP: used for about 10  years, stopped 20 years ago





Family history:


father: melanoma


mother: breast cancer  ? thyroid cancer


maternal aunt: ovarian cancer


maternal aunt: lung cancer


paternal aunt: colon cancer








Hormonal History:


menarche: 16


[id number], breast fed: yes, age at first live birth: 36


menopause: 54


hormones: none





Surgical history:


d&C after miscarriage


finger 








Medical History:


asthma








Social History:


nicotine: none


alcohol: 3-5 times/week wine


drugs: none 

















- Constitutional


Constitutional: Reports sweats, Denies chills, Denies fever





- EENT


Eyes: denies blurred vision, denies pain


Ears: deny: decreased hearing, tinnitus


Ears, nose, mouth and throat: Denies headache, Denies sore throat





- Breasts


Breasts: bilateral: as per HPI





- Cardiovascular


Cardiovascular: Denies chest pain, Denies shortness of breath





- Respiratory


Respiratory: Denies cough





- Gastrointestinal


Gastrointestinal: Denies abdominal pain, Denies diarrhea, Denies nausea, Denies 

vomiting





- Genitourinary (Female)


Genitourinary: Denies dysuria, Denies hematuria





- Menstruation


Menstruation: Reports postmenopausal





- Musculoskeletal


Musculoskeletal: Denies myalgias





- Integumentary


Integumentary: Denies pruritus, Denies rash





- Neurological


Neurological: Denies numbness, Denies weakness





- Psychiatric


Psychiatric: Denies anxiety, Denies depression





- Endocrine


Endocrine: Reports weight change, Denies fatigue





- Hematologic/Lymphatic


Comment: 





none





- Allergic/Immunologic


Allergic/Immunologic: Reports as per HPI





Past Medical History


Additional Past Medical History / Comment(s): Patient states she broke distal 

fibula on 10-01-17


History of Any Multi-Drug Resistant Organisms: None Reported


Additional Past Surgical History / Comment(s): D & C procedure following 

miscarriage, surgery on hand to remove wood splinter, wisdom teeth extracted at 

age 20


Past Anesthesia/Blood Transfusion Reactions: Postoperative Nausea & Vomiting 

(PONV)


Additional Past Anesthesia/Blood Transfusion Reaction / Comment(s): PONV at age 

20 with wisdom teeth extraction, none experienced with D&C or Hand surgery


Past Psychological History: No Psychological Hx Reported


Smoking Status: Former smoker


Past Alcohol Use History: Occasional


Additional Past Alcohol Use History / Comment(s): Patient states she smoked 

about 1/2 pack/day during ages [age] years old.


Past Drug Use History: None Reported























Objective





- Constitutional


General appearance: Present: cooperative





- EENT


Eyes: Present: EOMI


ENT: Present: hearing grossly normal





- Neck


Neck: Present: normal ROM





- Respiratory


Respiratory: bilateral: CTA





- Cardiovascular


Heart sounds: normal: S1, S2





- Integumentary


Integumentary: Present: normal turgor





- Musculoskeletal


Musculoskeletal: Present: gait normal





- Psychiatric


Psychiatric: Present: A&O x's 3, appropriate affect, intact judgment & insight





- Additional findings


Additional findings: 





Breast Exam:


BRA: 36B


Inspection: Bilateral grade 2 ptosis


Palpation:


Right breast: Multi-positional exam fibrocystic changes no dominant masses or 

nodules of concern


Right axilla: shoddy adenopathy non worrisome


Left breast: Multi-positional exam fibrocystic changes no dominant masses or 

nodules of concern


Left axilla:  shotty adenopathy  non-worrisome











Assessment and Plan


Assessment: 


Impression:


Stable fibrocystic breast changes


Family history breast cancer


bilateral mammogram 24 BIRAD 1 (personally interpreted)





Plan:


Bilateral mammogram in 1 year with physician exam at that time


Debbie risk model 3.3%


NCI lifetime model risk 14.5%


Talked about risk reduction, and at this time the patient is not interested.  

She will be followed with close surveillance.





CC: Dr. Lopez